# Patient Record
Sex: MALE | Race: WHITE
[De-identification: names, ages, dates, MRNs, and addresses within clinical notes are randomized per-mention and may not be internally consistent; named-entity substitution may affect disease eponyms.]

---
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
## 2025-03-13 NOTE — PRE-ANESTHESIA EVALUATION ADULT - NSANTHASARD_GEN_ALL_CORE
Assumed care @ 0730  A&ox4  RA  NSR on tele   Meds in MAR given  Safety precautions in place  Continuing to meet pt needs  Need further details reference flowsheets   Cardiac protocol  X1 walker  Low fiber soft diet  Need further details reference flowsheets   2